# Patient Record
Sex: MALE | Race: WHITE | HISPANIC OR LATINO | Employment: FULL TIME | URBAN - METROPOLITAN AREA
[De-identification: names, ages, dates, MRNs, and addresses within clinical notes are randomized per-mention and may not be internally consistent; named-entity substitution may affect disease eponyms.]

---

## 2022-01-28 ENCOUNTER — HOSPITAL ENCOUNTER (EMERGENCY)
Facility: CLINIC | Age: 22
Discharge: HOME | End: 2022-01-28
Attending: NURSE PRACTITIONER | Admitting: NURSE PRACTITIONER
Payer: COMMERCIAL

## 2022-01-28 VITALS
RESPIRATION RATE: 20 BRPM | OXYGEN SATURATION: 100 % | TEMPERATURE: 97.7 F | DIASTOLIC BLOOD PRESSURE: 67 MMHG | HEART RATE: 52 BPM | SYSTOLIC BLOOD PRESSURE: 146 MMHG

## 2022-01-28 DIAGNOSIS — S61.214A LACERATION OF RIGHT RING FINGER WITHOUT FOREIGN BODY WITHOUT DAMAGE TO NAIL, INITIAL ENCOUNTER: ICD-10-CM

## 2022-01-28 PROCEDURE — 90471 IMMUNIZATION ADMIN: CPT | Performed by: PHYSICIAN ASSISTANT

## 2022-01-28 PROCEDURE — 29130 APPL FINGER SPLINT STATIC: CPT | Mod: RT

## 2022-01-28 PROCEDURE — 250N000011 HC RX IP 250 OP 636: Performed by: PHYSICIAN ASSISTANT

## 2022-01-28 PROCEDURE — 90715 TDAP VACCINE 7 YRS/> IM: CPT | Performed by: PHYSICIAN ASSISTANT

## 2022-01-28 PROCEDURE — 99284 EMERGENCY DEPT VISIT MOD MDM: CPT | Mod: 25

## 2022-01-28 RX ADMIN — CLOSTRIDIUM TETANI TOXOID ANTIGEN (FORMALDEHYDE INACTIVATED), CORYNEBACTERIUM DIPHTHERIAE TOXOID ANTIGEN (FORMALDEHYDE INACTIVATED), BORDETELLA PERTUSSIS TOXOID ANTIGEN (GLUTARALDEHYDE INACTIVATED), BORDETELLA PERTUSSIS FILAMENTOUS HEMAGGLUTININ ANTIGEN (FORMALDEHYDE INACTIVATED), BORDETELLA PERTUSSIS PERTACTIN ANTIGEN, AND BORDETELLA PERTUSSIS FIMBRIAE 2/3 ANTIGEN 0.5 ML: 5; 2; 2.5; 5; 3; 5 INJECTION, SUSPENSION INTRAMUSCULAR at 22:18

## 2022-01-28 ASSESSMENT — ENCOUNTER SYMPTOMS
WOUND: 1
FEVER: 0
NUMBNESS: 0
JOINT SWELLING: 0

## 2022-01-29 NOTE — ED PROVIDER NOTES
History     Chief Complaint:    Laceration      HPI   Kulwinder Meadows is a 21 year old male who presents with he cut his right ring finger on an ice auger 2 days ago.  He has flap laceration over his PIP joint of fourth finger of his right hand.  He is to try to keep the wound clean.  He comes in to see if this can be closed today as he continues to open up when he bends his finger.  No purulent discharge or redness.    Review of Systems   Constitutional: Negative for fever.   Musculoskeletal: Negative for joint swelling.   Skin: Positive for wound.   Neurological: Negative for numbness.   All other systems reviewed and are negative.        Allergies:      No Known Allergies      Medications:      No current outpatient medications on file.      Past Medical History:        No past medical history on file.  There are no problems to display for this patient.       Past Surgical History:        No past surgical history on file.    Family History:        No family history on file.    Social History:  Non smoker    Physical Exam     Patient Vitals for the past 24 hrs:   BP Temp Temp src Pulse Resp SpO2   01/28/22 2140 (!) 146/67 97.7  F (36.5  C) Temporal 52 20 100 %       Physical Exam  General:Vitals reviewed  Skin: Noted flap 1-1/2 laceration dorsal surface of the PIP joint of the fourth right finger.  With about a centimeter in depth.  Some fuzz of the wound from gloves.  No tendon involvement.  No erythema or induration around the wound.  Neuro Sensation intact Normal strength of the finger    Musculoskeletal: Right hand: All fingers with full range of motion without pain.  Cap refill less than 2 seconds.  No nail damage of the right ring finger.  Palpation is nontender.  No swelling or obvious deformity or ecchymosis of the right ring finger.      Emergency Department Course         Reviewed:    I reviewed nursing notes, vitals, past history and care everywhere      Medications   Tdap  (tetanus-diphtheria-acell pertussis) (ADACEL) injection 0.5 mL (has no administration in time range)       Disposition:  The patient was discharged to home.    Impression & Plan        Medical Decision Making:  After carefully reviewing the patient's past medical history history of present illness laboratory values and work-up and physical exam my impression of today's diagnosis is right ring finger laceration      Unfortunately the wound is beyond the 19-hour window for repair today.  The wound will be cleansed and dressed with a Band-Aid.  I will provide him with finger splint to reduce the range of motion to improve healing so the wound does not continue to rip open.  I recommend he follow-up with his primary doctor in 3 days that would be the time.  If the wound is Clean to consider delayed primary closure.  If he notes any signs of infection he needs to be reevaluated.    He can always return to the emergency department if his condition worsens.    Diagnosis:    ICD-10-CM    1. Laceration of right ring finger without foreign body without damage to nail, initial encounter  S61.214A             Armando Garcia, RENE  01/28/22 2217

## 2022-01-29 NOTE — ED TRIAGE NOTES
Pt arrives with c/o finger laceration to right 4th digit that happened 2 days ago. Pt reports it keeps opening. Unknown last tetanus. ABCs intact.

## 2022-01-29 NOTE — ED PROVIDER NOTES
Emergency Department Attending Supervision Note  1/28/2022  9:58 PM      I evaluated this patient in conjunction with Armando Garcia [facility]    Briefly, the patient presented with  Presents for evaluation of finger laceration.  Two days account he cut his finger on his ice auger.  Do to the wound remaining open he presented today for evaluation. No numbness or weakness.    Patient Vitals for the past 24 hrs:   BP Temp Temp src Pulse Resp SpO2   01/28/22 2140 (!) 146/67 97.7  F (36.5  C) Temporal 52 20 100 %       On my exam:  General: Alert, No obvious discomfort, well kept   HENT:  Normal voice, No lymphadenopathy  Eyes:  The pupils are equal, round, and reactive to light, Conjunctiva normal, No scleral icterus   Neck:  Normal range of motion  CV:  Normal Pulses, Normal cap refill  Resp:  Non-labored, No cough  MS:  1.5 cm flap type laceration of right ring finger, No indication for Flexor or extensor tendon injury, Normal ROM if all digits  Skin:  No rash or acute skin lesions noted  Neuro:  Speech is normal and fluent, Normal sensation  Psych: Awake. Alert.  Normal affect.  Appropriate interactions. Good eye contact      48 hours old unclean wound; so repairable.  Delayed closure option is possible. Follow up with PCP.  Wound cleaned and dressed here.  Splint to limit movement and protect wound provided.      Diagnosis    ICD-10-CM    1. Laceration of right ring finger without foreign body without damage to nail, initial encounter  S61.214A          PATRIC Donahue CNPCN 1/28/2022 9:58 PM     Reyes Finn APRN CNP  01/28/22 2215

## 2022-01-29 NOTE — DISCHARGE INSTRUCTIONS
Your laceration was not closed as it is beyond 19 hours for appropriate closure.  The wound was irrigated.  You should continue to maintain cleanliness of the wound.  It should be wrapped and he should keep this covered at work.  I recommend you follow-up with your family doctor in about 3 days for delayed primary closure.

## 2023-06-26 ENCOUNTER — HOSPITAL ENCOUNTER (EMERGENCY)
Facility: CLINIC | Age: 23
Discharge: HOME | End: 2023-06-26
Attending: EMERGENCY MEDICINE | Admitting: EMERGENCY MEDICINE

## 2023-06-26 ENCOUNTER — APPOINTMENT (OUTPATIENT)
Dept: CT IMAGING | Facility: CLINIC | Age: 23
End: 2023-06-26
Attending: EMERGENCY MEDICINE

## 2023-06-26 VITALS
RESPIRATION RATE: 18 BRPM | HEART RATE: 45 BPM | DIASTOLIC BLOOD PRESSURE: 74 MMHG | WEIGHT: 185 LBS | TEMPERATURE: 97 F | OXYGEN SATURATION: 98 % | BODY MASS INDEX: 21.4 KG/M2 | SYSTOLIC BLOOD PRESSURE: 105 MMHG | HEIGHT: 78 IN

## 2023-06-26 DIAGNOSIS — R55 SYNCOPE, UNSPECIFIED SYNCOPE TYPE: ICD-10-CM

## 2023-06-26 DIAGNOSIS — S09.90XA INJURY OF HEAD, INITIAL ENCOUNTER: ICD-10-CM

## 2023-06-26 LAB
ANION GAP SERPL CALCULATED.3IONS-SCNC: 8 MMOL/L (ref 7–15)
ATRIAL RATE - MUSE: 44 BPM
BASOPHILS # BLD AUTO: 0 10E3/UL (ref 0–0.2)
BASOPHILS NFR BLD AUTO: 1 %
BUN SERPL-MCNC: 19.3 MG/DL (ref 6–20)
CALCIUM SERPL-MCNC: 9.1 MG/DL (ref 8.6–10)
CHLORIDE SERPL-SCNC: 104 MMOL/L (ref 98–107)
CREAT SERPL-MCNC: 1.09 MG/DL (ref 0.67–1.17)
DEPRECATED HCO3 PLAS-SCNC: 26 MMOL/L (ref 22–29)
DIASTOLIC BLOOD PRESSURE - MUSE: NORMAL MMHG
EOSINOPHIL # BLD AUTO: 0.4 10E3/UL (ref 0–0.7)
EOSINOPHIL NFR BLD AUTO: 5 %
ERYTHROCYTE [DISTWIDTH] IN BLOOD BY AUTOMATED COUNT: 12.7 % (ref 10–15)
GFR SERPL CREATININE-BSD FRML MDRD: >90 ML/MIN/1.73M2
GLUCOSE SERPL-MCNC: 102 MG/DL (ref 70–99)
HCT VFR BLD AUTO: 38.8 % (ref 40–53)
HGB BLD-MCNC: 13 G/DL (ref 13.3–17.7)
IMM GRANULOCYTES # BLD: 0 10E3/UL
IMM GRANULOCYTES NFR BLD: 0 %
INTERPRETATION ECG - MUSE: NORMAL
LYMPHOCYTES # BLD AUTO: 2.2 10E3/UL (ref 0.8–5.3)
LYMPHOCYTES NFR BLD AUTO: 29 %
MCH RBC QN AUTO: 29.7 PG (ref 26.5–33)
MCHC RBC AUTO-ENTMCNC: 33.5 G/DL (ref 31.5–36.5)
MCV RBC AUTO: 89 FL (ref 78–100)
MONOCYTES # BLD AUTO: 0.6 10E3/UL (ref 0–1.3)
MONOCYTES NFR BLD AUTO: 8 %
NEUTROPHILS # BLD AUTO: 4.5 10E3/UL (ref 1.6–8.3)
NEUTROPHILS NFR BLD AUTO: 57 %
NRBC # BLD AUTO: 0 10E3/UL
NRBC BLD AUTO-RTO: 0 /100
P AXIS - MUSE: -4 DEGREES
PLATELET # BLD AUTO: 211 10E3/UL (ref 150–450)
POTASSIUM SERPL-SCNC: 3.6 MMOL/L (ref 3.4–5.3)
PR INTERVAL - MUSE: 206 MS
QRS DURATION - MUSE: 114 MS
QT - MUSE: 432 MS
QTC - MUSE: 369 MS
R AXIS - MUSE: 122 DEGREES
RBC # BLD AUTO: 4.37 10E6/UL (ref 4.4–5.9)
SODIUM SERPL-SCNC: 138 MMOL/L (ref 136–145)
SYSTOLIC BLOOD PRESSURE - MUSE: NORMAL MMHG
T AXIS - MUSE: 34 DEGREES
VENTRICULAR RATE- MUSE: 44 BPM
WBC # BLD AUTO: 7.7 10E3/UL (ref 4–11)

## 2023-06-26 PROCEDURE — 85025 COMPLETE CBC W/AUTO DIFF WBC: CPT | Performed by: EMERGENCY MEDICINE

## 2023-06-26 PROCEDURE — 80048 BASIC METABOLIC PNL TOTAL CA: CPT | Performed by: EMERGENCY MEDICINE

## 2023-06-26 PROCEDURE — 36415 COLL VENOUS BLD VENIPUNCTURE: CPT | Performed by: EMERGENCY MEDICINE

## 2023-06-26 PROCEDURE — 99285 EMERGENCY DEPT VISIT HI MDM: CPT | Mod: 25

## 2023-06-26 PROCEDURE — 70450 CT HEAD/BRAIN W/O DYE: CPT

## 2023-06-26 PROCEDURE — 250N000011 HC RX IP 250 OP 636: Mod: JZ | Performed by: EMERGENCY MEDICINE

## 2023-06-26 PROCEDURE — 93005 ELECTROCARDIOGRAM TRACING: CPT

## 2023-06-26 PROCEDURE — 96374 THER/PROPH/DIAG INJ IV PUSH: CPT

## 2023-06-26 RX ORDER — ONDANSETRON 4 MG/1
4 TABLET, ORALLY DISINTEGRATING ORAL EVERY 8 HOURS PRN
Qty: 10 TABLET | Refills: 0 | Status: SHIPPED | OUTPATIENT
Start: 2023-06-26 | End: 2023-06-29

## 2023-06-26 RX ORDER — ONDANSETRON 2 MG/ML
4 INJECTION INTRAMUSCULAR; INTRAVENOUS
Status: DISCONTINUED | OUTPATIENT
Start: 2023-06-26 | End: 2023-06-26 | Stop reason: HOSPADM

## 2023-06-26 RX ADMIN — ONDANSETRON 4 MG: 2 INJECTION INTRAMUSCULAR; INTRAVENOUS at 03:45

## 2023-06-26 ASSESSMENT — ACTIVITIES OF DAILY LIVING (ADL): ADLS_ACUITY_SCORE: 35

## 2023-06-26 NOTE — ED TRIAGE NOTES
Pt reports he took a [employer] THCP at 2100. Pt reports he got up to go to the bathroom and got lightheaded and dizzy and fell and hit the toilet. Pt wife helped sit him up. Pt stood to try to void then fell back and hit his head on the bath tub and wife reports the pt had seizures. Pt awake, alert and oriented. Pt denies pain. No hx of seizures.

## 2023-06-26 NOTE — ED PROVIDER NOTES
"  History     Chief Complaint:  Seizures       HPI   Kulwinder Meadows is a 22 year old male who presents for evaluation of loss of consciousness, head injury, possible seizure.  Patient states that he took THC this evening, went to go to the bathroom, and had an episode of near syncope while urinating.  He caught himself falling forward, then set up, and then fell backward striking his head on the bathtub.  Subsequent, his wife believes she is him having 10 to 15 seconds of full body shaking with bladder incontinence.  He regained consciousness shortly thereafter and noted headache and mild confusion.  At this time, he notes mild headache and feeling \"foggy\" but otherwise denies any concerns.  Specifically, no chest pain, shortness of breath, abdominal pain, back pain, neck pain, or any other concerns.          Independent Historian:    Wife provides the majority of the above history          Medications:    No current outpatient medications on file.    Past Medical History:    History reviewed.  No pertinent past medical history.    Physical Exam     Patient Vitals for the past 24 hrs:   BP Temp Temp src Pulse Resp SpO2 Height Weight   06/26/23 0420 105/74 -- -- (!) 45 -- 98 % -- --   06/26/23 0400 106/53 -- -- (!) 44 -- 95 % -- --   06/26/23 0309 106/48 97  F (36.1  C) Temporal 57 18 99 % 1.981 m (6' 6\") 83.9 kg (185 lb)        Physical Exam  Constitutional: Alert, attentive  HENT:    Nose: Nose normal.    Mouth/Throat: Oropharynx is clear, mucous membranes are moist  Eyes: EOM are normal. Pupils are equal, round, and reactive to light.   CV: Bradycardic, regular rhythm; no murmurs, rubs or gallops.  Chest: Effort normal and breath sounds normal.   GI: No distension. There is no tenderness  MSK: Normal range of motion.    C-spine cleared by Nexus   No tenderness or stepoffs to the C/T/L-spine   Normal, atraumatic inspection to the back   Neurological:   GCS 15; A/Ox3; Cranial nerves 2-12 intact;   5/5 strength " throughout the upper and lower extremities;   sensation intact to light touch throughout the upper and lower extremities;   2+ DTRs to the bilateral upper and lower extremities (biceps, BRs, patellar, achilles);   normal fine motor coordination intact bilaterally;   normal gait   Skin: Skin is warm and dry.        Emergency Department Course   ECG  ECG taken at 0319, ECG read   Marked sinus bradycardia with sinus arrhythmia  Right axis deviation  Abnormal ECG   Rate 44 bpm. SC interval 206 ms. QRS duration 114 ms. QT/QTc 432/369 ms. P-R-T axes -4 122 34.     Imaging:  CT Head w/o Contrast   Final Result   IMPRESSION:   1.  No acute intracranial process.         Report per radiology    Laboratory:  Labs Ordered and Resulted from Time of ED Arrival to Time of ED Departure   BASIC METABOLIC PANEL - Abnormal       Result Value    Sodium 138      Potassium 3.6      Chloride 104      Carbon Dioxide (CO2) 26      Anion Gap 8      Urea Nitrogen 19.3      Creatinine 1.09      Calcium 9.1      Glucose 102 (*)     GFR Estimate >90     CBC WITH PLATELETS AND DIFFERENTIAL - Abnormal    WBC Count 7.7      RBC Count 4.37 (*)     Hemoglobin 13.0 (*)     Hematocrit 38.8 (*)     MCV 89      MCH 29.7      MCHC 33.5      RDW 12.7      Platelet Count 211      % Neutrophils 57      % Lymphocytes 29      % Monocytes 8      % Eosinophils 5      % Basophils 1      % Immature Granulocytes 0      NRBCs per 100 WBC 0      Absolute Neutrophils 4.5      Absolute Lymphocytes 2.2      Absolute Monocytes 0.6      Absolute Eosinophils 0.4      Absolute Basophils 0.0      Absolute Immature Granulocytes 0.0      Absolute NRBCs 0.0          Emergency Department Course & Assessments:    Interventions:  Medications   ondansetron (ZOFRAN) injection 4 mg (4 mg Intravenous $Given 6/26/23 1725)        Assessments:  0322 Initial assessment. I gathered history and examined the patient as noted above.   0522 I rechecked the patient and explained findings. He  feels improved.    Independent Interpretation (X-rays, CTs, rhythm strip):  No intracranial hemorrhage on CT head    Social Determinants of Health affecting care:   None    Disposition:  The patient was discharged to home.     Impression & Plan      Medical Decision Making:  This is a 22-year-old male who presents for evaluation of apparent syncope and head injury associated with seizure.  He has no previous seizure disorder.  It is unclear based on the above the patient had near versus full syncope, nonetheless the above work was performed.  Patient is PERC negative, essentially ruling out PE.  He is in sinus bradycardia on EKG but maintains normal blood pressures and no dizziness.  There is no hematologic or metabolic abnormality to explain his symptoms.  THC could have contributed to the above presentation.  Regarding his head injury and seizure, CT head was performed and fortunately shows no skull fracture or intracranial injury.  On recheck, he has mild headache and nausea but otherwise is asymptomatic.  This is consistent with concussion.  Plan primary care follow-up for recheck in 3 to 5 days, supportive cares at home, return precautions for severe headache, confusion, intractable vomiting, or any other concerns.      Diagnosis:    ICD-10-CM    1. Injury of head, initial encounter  S09.90XA       2. Syncope, unspecified syncope type  R55            Scribe Disclosure:  Debra MAYER, am serving as a scribe at 3:20 AM on 6/26/2023 to document services personally performed by León Ricci MD based on my observations and the provider's statements to me.     6/26/2023   León Ricci MD Houghland, John Eric, MD  06/26/23 0537